# Patient Record
Sex: MALE | Race: OTHER
[De-identification: names, ages, dates, MRNs, and addresses within clinical notes are randomized per-mention and may not be internally consistent; named-entity substitution may affect disease eponyms.]

---

## 2019-02-07 ENCOUNTER — HOSPITAL ENCOUNTER (EMERGENCY)
Dept: HOSPITAL 72 - EMR | Age: 44
Discharge: HOME | End: 2019-02-07
Payer: MEDICAID

## 2019-02-07 VITALS — SYSTOLIC BLOOD PRESSURE: 105 MMHG | DIASTOLIC BLOOD PRESSURE: 68 MMHG

## 2019-02-07 VITALS — DIASTOLIC BLOOD PRESSURE: 68 MMHG | SYSTOLIC BLOOD PRESSURE: 105 MMHG

## 2019-02-07 VITALS — WEIGHT: 141 LBS | BODY MASS INDEX: 22.66 KG/M2 | HEIGHT: 66 IN

## 2019-02-07 VITALS — DIASTOLIC BLOOD PRESSURE: 68 MMHG | SYSTOLIC BLOOD PRESSURE: 111 MMHG

## 2019-02-07 DIAGNOSIS — J06.9: Primary | ICD-10-CM

## 2019-02-07 DIAGNOSIS — B34.9: ICD-10-CM

## 2019-02-07 DIAGNOSIS — N39.0: ICD-10-CM

## 2019-02-07 LAB
ADD MANUAL DIFF: NO
ALBUMIN SERPL-MCNC: 3.7 G/DL (ref 3.4–5)
ALBUMIN/GLOB SERPL: 0.9 {RATIO} (ref 1–2.7)
ALP SERPL-CCNC: 67 U/L (ref 46–116)
ALT SERPL-CCNC: 32 U/L (ref 12–78)
ANION GAP SERPL CALC-SCNC: 10 MMOL/L (ref 5–15)
APPEARANCE UR: CLEAR
APTT PPP: (no result) S
AST SERPL-CCNC: 26 U/L (ref 15–37)
BASOPHILS NFR BLD AUTO: 0.5 % (ref 0–2)
BILIRUB DIRECT SERPL-MCNC: 0.2 MG/DL (ref 0–0.3)
BILIRUB SERPL-MCNC: 1.2 MG/DL (ref 0.2–1)
BUN SERPL-MCNC: 18 MG/DL (ref 7–18)
CALCIUM SERPL-MCNC: 8.5 MG/DL (ref 8.5–10.1)
CHLORIDE SERPL-SCNC: 101 MMOL/L (ref 98–107)
CO2 SERPL-SCNC: 25 MMOL/L (ref 21–32)
CREAT SERPL-MCNC: 1 MG/DL (ref 0.55–1.3)
EOSINOPHIL NFR BLD AUTO: 0.2 % (ref 0–3)
ERYTHROCYTE [DISTWIDTH] IN BLOOD BY AUTOMATED COUNT: 11.4 % (ref 11.6–14.8)
GLOBULIN SER-MCNC: 4.2 G/DL
GLUCOSE UR STRIP-MCNC: NEGATIVE MG/DL
HCT VFR BLD CALC: 41.8 % (ref 42–52)
HGB BLD-MCNC: 14.4 G/DL (ref 14.2–18)
KETONES UR QL STRIP: NEGATIVE
LEUKOCYTE ESTERASE UR QL STRIP: (no result)
LYMPHOCYTES NFR BLD AUTO: 11.2 % (ref 20–45)
MCV RBC AUTO: 86 FL (ref 80–99)
MONOCYTES NFR BLD AUTO: 9.1 % (ref 1–10)
NEUTROPHILS NFR BLD AUTO: 79.1 % (ref 45–75)
NITRITE UR QL STRIP: NEGATIVE
PH UR STRIP: 6.5 [PH] (ref 4.5–8)
PLATELET # BLD: 137 K/UL (ref 150–450)
POTASSIUM SERPL-SCNC: 3.8 MMOL/L (ref 3.5–5.1)
PROT UR QL STRIP: (no result)
RBC # BLD AUTO: 4.84 M/UL (ref 4.7–6.1)
SODIUM SERPL-SCNC: 136 MMOL/L (ref 136–145)
SP GR UR STRIP: 1.01 (ref 1–1.03)
UROBILINOGEN UR-MCNC: NORMAL MG/DL (ref 0–1)
WBC # BLD AUTO: 6.3 K/UL (ref 4.8–10.8)

## 2019-02-07 PROCEDURE — 82248 BILIRUBIN DIRECT: CPT

## 2019-02-07 PROCEDURE — 96361 HYDRATE IV INFUSION ADD-ON: CPT

## 2019-02-07 PROCEDURE — 81003 URINALYSIS AUTO W/O SCOPE: CPT

## 2019-02-07 PROCEDURE — 71045 X-RAY EXAM CHEST 1 VIEW: CPT

## 2019-02-07 PROCEDURE — 85025 COMPLETE CBC W/AUTO DIFF WBC: CPT

## 2019-02-07 PROCEDURE — 96374 THER/PROPH/DIAG INJ IV PUSH: CPT

## 2019-02-07 PROCEDURE — 80053 COMPREHEN METABOLIC PANEL: CPT

## 2019-02-07 PROCEDURE — 36415 COLL VENOUS BLD VENIPUNCTURE: CPT

## 2019-02-07 PROCEDURE — 99284 EMERGENCY DEPT VISIT MOD MDM: CPT

## 2019-02-07 NOTE — NUR
ED Nurse Note:

Patient is resting comfortably, fluids are running ,patient has no reports of 
pain and fever is breaking.

## 2019-02-07 NOTE — EMERGENCY ROOM REPORT
History of Present Illness


General


Chief Complaint:  Flu Like Symptoms


Source:  Patient





Present Illness


HPI


43-year-old male presenting with fever, chills, cough, nausea vomiting, slight 

epigastric pain.  Says it's been going on for the last 2 days.  Also at times 

gets body aches.  Did not get the flu vaccine.  Has been able to tolerate some 

fluid.  Denies any other medical problems are and no complaints


Allergies:  


Coded Allergies:  


     No Known Allergies (Unverified , 2/9/14)





Patient History


Past Medical History:  see triage record


Past Surgical History:  none


Pertinent Family History:  none


Reviewed Nursing Documentation:  PMH: Agreed; PSxH: Agreed





Nursing Documentation-PMH


Past Medical History:  No Stated History





Review of Systems


All Other Systems:  negative except mentioned in HPI





Physical Exam





Vital Signs








  Date Time  Temp Pulse Resp B/P (MAP) Pulse Ox O2 Delivery O2 Flow Rate FiO2


 


2/7/19 19:48 101.7 107 16 111/68 96 Room Air  








Sp02 EP Interpretation:  reviewed, normal


General Appearance:  alert, GCS 15, non-toxic, mild distress


Head:  normocephalic, atraumatic


Eyes:  bilateral eye normal inspection, bilateral eye PERRL, bilateral eye EOMI


ENT:  normal ENT inspection, normal pharynx, normal voice, moist mucus membranes


Neck:  normal inspection, full range of motion, supple


Respiratory:  normal inspection, lungs clear, normal breath sounds, no 

respiratory distress, no retraction, no wheezing, speaking full sentences, 

chest symmetrical


Cardiovascular #1:  normal inspection, regular rate, rhythm, no edema, normal 

capillary refill


Cardiovascular #2:  2+ radial (R), 2+ radial (L)


Gastrointestinal:  normal inspection, non tender, soft, non-distended, no 

guarding


Genitourinary:  no CVA tenderness


Musculoskeletal:  normal inspection, back normal, normal range of motion, non-

tender


Neurologic:  normal inspection, alert, oriented x3, responsive, motor strength/

tone normal, sensory intact, normal gait, speech normal


Psychiatric:  normal inspection, judgement/insight normal, memory normal


Skin:  normal inspection, normal color, no rash, warm/dry, well hydrated, 

normal turgor





Medical Decision Making


Diagnostic Impression:  


 Primary Impression:  


 Viral URI


 Additional Impression:  


 UTI (urinary tract infection)


ER Course


43-year-old male, fever chills cough nausea vomiting





DDX:


URI, pneumonia, influenza, gastroenteritis, gastritis





Plan:


Obtain labs, ua, ucx, CXR, EKG








ER course:


Patient has remained stable during ED stay.


Given fluids and Zofran.


after fluids feeling much better


tolerating PO


urine appears slightly cloudy with bacteria, so given keflex





Disposition:


Patient is to be discharged to home.


Prescriptions given are tamiflu and keflex for possible UTI


Patient is instructed to follow up with their primary care doctor within 5 

days.  





Please note that this Emergency Department Report was dictated using [email or web address] technology software, occasionally this can lead to 

erroneous entry secondary to interpretation by the dictation equipment





Chest X-ray


CXR: Ordered: Yes


1 view


Indication: cough


EP interpretation: Yes


Interpretation: No consolidation, no effusion, no PTX, no acute cardiopulmonary 

disease


Impression: No acute disease





Electronically signed by Erlin Horton MD





Laboratory Tests








Test


  2/7/19


20:20 2/7/19


20:25


 


Urine Color Orange   


 


Urine Appearance Clear   


 


Urine pH 6.5 (4.5-8.0)   


 


Urine Specific Gravity


  1.010


(1.005-1.035) 


 


 


Urine Protein


  1+ (NEGATIVE)


H 


 


 


Urine Glucose (UA)


  Negative


(NEGATIVE) 


 


 


Urine Ketones


  Negative


(NEGATIVE) 


 


 


Urine Blood


  Negative


(NEGATIVE) 


 


 


Urine Nitrite


  Negative


(NEGATIVE) 


 


 


Urine Bilirubin


  Negative


(NEGATIVE) 


 


 


Urine Urobilinogen


  Normal MG/DL


(0.0-1.0) 


 


 


Urine Leukocyte Esterase


  1+ (NEGATIVE)


H 


 


 


Urine RBC


  0 /HPF (0 - 0)


  


 


 


Urine WBC


  0-2 /HPF (0 -


0) 


 


 


Urine Squamous Epithelial


Cells Occasional


/LPF 


 


 


Urine Bacteria


  Occasional


/HPF (NONE) 


 


 


White Blood Count


  


  6.3 K/UL


(4.8-10.8)


 


Red Blood Count


  


  4.84 M/UL


(4.70-6.10)


 


Hemoglobin


  


  14.4 G/DL


(14.2-18.0)


 


Hematocrit


  


  41.8 %


(42.0-52.0)  L


 


Mean Corpuscular Volume  86 FL (80-99)  


 


Mean Corpuscular Hemoglobin


  


  29.8 PG


(27.0-31.0)


 


Mean Corpuscular Hemoglobin


Concent 


  34.5 G/DL


(32.0-36.0)


 


Red Cell Distribution Width


  


  11.4 %


(11.6-14.8)  L


 


Platelet Count


  


  137 K/UL


(150-450)  L


 


Mean Platelet Volume


  


  11.0 FL


(6.5-10.1)  H


 


Neutrophils (%) (Auto)


  


  79.1 %


(45.0-75.0)  H


 


Lymphocytes (%) (Auto)


  


  11.2 %


(20.0-45.0)  L


 


Monocytes (%) (Auto)


  


  9.1 %


(1.0-10.0)


 


Eosinophils (%) (Auto)


  


  0.2 %


(0.0-3.0)


 


Basophils (%) (Auto)


  


  0.5 %


(0.0-2.0)


 


Sodium Level


  


  136 MMOL/L


(136-145)


 


Potassium Level


  


  3.8 MMOL/L


(3.5-5.1)


 


Chloride Level


  


  101 MMOL/L


()


 


Carbon Dioxide Level


  


  25 MMOL/L


(21-32)


 


Anion Gap


  


  10 mmol/L


(5-15)


 


Blood Urea Nitrogen


  


  18 mg/dL


(7-18)


 


Creatinine


  


  1.0 MG/DL


(0.55-1.30)


 


Estimate Glomerular


Filtration Rate 


  > 60 mL/min


(>60)


 


Glucose Level


  


  108 MG/DL


()  H


 


Calcium Level


  


  8.5 MG/DL


(8.5-10.1)


 


Total Bilirubin


  


  1.2 MG/DL


(0.2-1.0)  H


 


Direct Bilirubin


  


  0.2 MG/DL


(0.0-0.3)


 


Aspartate Amino Transferase


(AST) 


  26 U/L (15-37)


 


 


Alanine Aminotransferase (ALT)


  


  32 U/L (12-78)


 


 


Alkaline Phosphatase


  


  67 U/L


()


 


Total Protein


  


  7.9 G/DL


(6.4-8.2)


 


Albumin


  


  3.7 G/DL


(3.4-5.0)


 


Globulin  4.2 g/dL  


 


Albumin/Globulin Ratio


  


  0.9 (1.0-2.7)


L











Last Vital Signs








  Date Time  Temp Pulse Resp B/P (MAP) Pulse Ox O2 Delivery O2 Flow Rate FiO2


 


2/7/19 19:48 101.7 107 16 111/68 96 Room Air  








Disposition:  HOME, SELF-CARE


Condition:  Stable


Scripts


Cephalexin* (KEFLEX*) 500 Mg Capsule


500 MG ORAL EVERY 6 HOURS for 7 Days, #28 CAP


   Prov: Retino,Clairose M.D.         2/7/19 


Oseltamivir Phosphate (Tamiflu) 75 Mg Capsule


75 MG ORAL TWICE A DAY for 5 Days, #10 CAP


   Prov: Retino,Clairose M.D.         2/7/19


Referrals:  


NOT CHOSEN [facility],REFERRING (PCP)











Erlin Hortno M.D. Feb 7, 2019 20:59

## 2019-02-07 NOTE — NUR
ED Nurse Note:

patient discharged in stable condition, ambulatory with steady gait, IV 
removed, ID band removed, patient verbalized  understanding of all instuctions. 
patient had no questions. Patientt planned to drive home.

## 2019-02-07 NOTE — NUR
ED Nurse Note:

PAtient presents with abdominal pain 10/10, reports buring sensation and nausea 
along with fever.

## 2019-02-08 NOTE — DIAGNOSTIC IMAGING REPORT
Indication: Cough

 

Comparison:  None

 

A single view chest radiograph was obtained.

 

Findings:

 

There is a rounded density in the right midlung which may be a small nodule. This is

probably calcified. Suggest further evaluation of this for clarification and more

definitive evaluation. PA lateral view of the chest may be helpful. Heart size is

normal. No pleural effusion identified. The diaphragmatic contour is smooth. The

bones are unremarkable.

 

IMPRESSION:

 

Small nodular density projected over the right midlung, possibly calcified. Suggest

repeating the examination is a PA and lateral view to evaluate this further.